# Patient Record
Sex: MALE | Race: WHITE | Employment: UNEMPLOYED | URBAN - METROPOLITAN AREA
[De-identification: names, ages, dates, MRNs, and addresses within clinical notes are randomized per-mention and may not be internally consistent; named-entity substitution may affect disease eponyms.]

---

## 2022-10-17 ENCOUNTER — HOSPITAL ENCOUNTER (INPATIENT)
Age: 42
LOS: 4 days | Discharge: ANOTHER HOSPITAL | DRG: 751 | End: 2022-10-21
Attending: EMERGENCY MEDICINE | Admitting: PSYCHIATRY & NEUROLOGY
Payer: MEDICAID

## 2022-10-17 DIAGNOSIS — F32.2 CURRENT SEVERE EPISODE OF MAJOR DEPRESSIVE DISORDER WITHOUT PSYCHOTIC FEATURES, UNSPECIFIED WHETHER RECURRENT (HCC): Primary | ICD-10-CM

## 2022-10-17 PROBLEM — F33.2 SEVERE RECURRENT MAJOR DEPRESSION WITHOUT PSYCHOTIC FEATURES (HCC): Status: ACTIVE | Noted: 2022-10-17

## 2022-10-17 LAB
ACETAMINOPHEN LEVEL: <5 UG/ML (ref 10–30)
ALBUMIN SERPL-MCNC: 4.3 G/DL (ref 3.5–4.6)
ALP BLD-CCNC: 107 U/L (ref 35–104)
ALT SERPL-CCNC: 19 U/L (ref 0–41)
AMPHETAMINE SCREEN, URINE: POSITIVE
ANION GAP SERPL CALCULATED.3IONS-SCNC: 10 MEQ/L (ref 9–15)
AST SERPL-CCNC: 15 U/L (ref 0–40)
BARBITURATE SCREEN URINE: ABNORMAL
BASOPHILS ABSOLUTE: 0 K/UL (ref 0–0.2)
BASOPHILS RELATIVE PERCENT: 0.3 %
BENZODIAZEPINE SCREEN, URINE: ABNORMAL
BILIRUB SERPL-MCNC: 0.3 MG/DL (ref 0.2–0.7)
BILIRUBIN URINE: NEGATIVE
BLOOD, URINE: NEGATIVE
BUN BLDV-MCNC: 10 MG/DL (ref 6–20)
CALCIUM SERPL-MCNC: 9 MG/DL (ref 8.5–9.9)
CANNABINOID SCREEN URINE: POSITIVE
CHLORIDE BLD-SCNC: 104 MEQ/L (ref 95–107)
CHOLESTEROL, TOTAL: 191 MG/DL (ref 0–199)
CLARITY: CLEAR
CO2: 27 MEQ/L (ref 20–31)
COCAINE METABOLITE SCREEN URINE: ABNORMAL
COLOR: YELLOW
CREAT SERPL-MCNC: 0.83 MG/DL (ref 0.7–1.2)
EOSINOPHILS ABSOLUTE: 0.2 K/UL (ref 0–0.7)
EOSINOPHILS RELATIVE PERCENT: 1.8 %
ETHANOL PERCENT: NORMAL G/DL
ETHANOL: <10 MG/DL (ref 0–0.08)
FENTANYL SCREEN, URINE: ABNORMAL
GFR AFRICAN AMERICAN: >60
GFR NON-AFRICAN AMERICAN: >60
GLOBULIN: 2.8 G/DL (ref 2.3–3.5)
GLUCOSE BLD-MCNC: 113 MG/DL (ref 70–99)
GLUCOSE URINE: NEGATIVE MG/DL
HCT VFR BLD CALC: 44.8 % (ref 42–52)
HDLC SERPL-MCNC: 48 MG/DL (ref 40–59)
HEMOGLOBIN: 14.7 G/DL (ref 14–18)
KETONES, URINE: NEGATIVE MG/DL
LDL CHOLESTEROL CALCULATED: 135 MG/DL (ref 0–129)
LEUKOCYTE ESTERASE, URINE: NEGATIVE
LYMPHOCYTES ABSOLUTE: 1.2 K/UL (ref 1–4.8)
LYMPHOCYTES RELATIVE PERCENT: 12.1 %
Lab: ABNORMAL
MCH RBC QN AUTO: 29.9 PG (ref 27–31.3)
MCHC RBC AUTO-ENTMCNC: 32.8 % (ref 33–37)
MCV RBC AUTO: 91.3 FL (ref 80–100)
METHADONE SCREEN, URINE: ABNORMAL
MONOCYTES ABSOLUTE: 0.7 K/UL (ref 0.2–0.8)
MONOCYTES RELATIVE PERCENT: 7.3 %
NEUTROPHILS ABSOLUTE: 7.7 K/UL (ref 1.4–6.5)
NEUTROPHILS RELATIVE PERCENT: 78.5 %
NITRITE, URINE: NEGATIVE
OPIATE SCREEN URINE: ABNORMAL
OXYCODONE URINE: ABNORMAL
PDW BLD-RTO: 13.9 % (ref 11.5–14.5)
PH UA: 5.5 (ref 5–9)
PHENCYCLIDINE SCREEN URINE: ABNORMAL
PLATELET # BLD: 250 K/UL (ref 130–400)
POTASSIUM SERPL-SCNC: 3.7 MEQ/L (ref 3.4–4.9)
PROPOXYPHENE SCREEN: ABNORMAL
PROTEIN UA: NEGATIVE MG/DL
RBC # BLD: 4.91 M/UL (ref 4.7–6.1)
SALICYLATE, SERUM: <0.3 MG/DL (ref 15–30)
SARS-COV-2, NAAT: NOT DETECTED
SODIUM BLD-SCNC: 141 MEQ/L (ref 135–144)
SPECIFIC GRAVITY UA: 1.02 (ref 1–1.03)
TOTAL CK: 68 U/L (ref 0–190)
TOTAL PROTEIN: 7.1 G/DL (ref 6.3–8)
TRIGL SERPL-MCNC: 38 MG/DL (ref 0–150)
TSH SERPL DL<=0.05 MIU/L-ACNC: 1.45 UIU/ML (ref 0.44–3.86)
URINE REFLEX TO CULTURE: NORMAL
UROBILINOGEN, URINE: 1 E.U./DL
WBC # BLD: 9.8 K/UL (ref 4.8–10.8)

## 2022-10-17 PROCEDURE — 36415 COLL VENOUS BLD VENIPUNCTURE: CPT

## 2022-10-17 PROCEDURE — 80143 DRUG ASSAY ACETAMINOPHEN: CPT

## 2022-10-17 PROCEDURE — 84443 ASSAY THYROID STIM HORMONE: CPT

## 2022-10-17 PROCEDURE — 81003 URINALYSIS AUTO W/O SCOPE: CPT

## 2022-10-17 PROCEDURE — 85025 COMPLETE CBC W/AUTO DIFF WBC: CPT

## 2022-10-17 PROCEDURE — 82550 ASSAY OF CK (CPK): CPT

## 2022-10-17 PROCEDURE — 80061 LIPID PANEL: CPT

## 2022-10-17 PROCEDURE — 80307 DRUG TEST PRSMV CHEM ANLYZR: CPT

## 2022-10-17 PROCEDURE — 80179 DRUG ASSAY SALICYLATE: CPT

## 2022-10-17 PROCEDURE — 99285 EMERGENCY DEPT VISIT HI MDM: CPT

## 2022-10-17 PROCEDURE — 80053 COMPREHEN METABOLIC PANEL: CPT

## 2022-10-17 PROCEDURE — 1240000000 HC EMOTIONAL WELLNESS R&B

## 2022-10-17 PROCEDURE — 82077 ASSAY SPEC XCP UR&BREATH IA: CPT

## 2022-10-17 PROCEDURE — 87635 SARS-COV-2 COVID-19 AMP PRB: CPT

## 2022-10-17 RX ORDER — HYDROXYZINE PAMOATE 50 MG/1
50 CAPSULE ORAL EVERY 6 HOURS PRN
Status: DISCONTINUED | OUTPATIENT
Start: 2022-10-17 | End: 2022-10-21 | Stop reason: HOSPADM

## 2022-10-17 RX ORDER — ACETAMINOPHEN 325 MG/1
650 TABLET ORAL EVERY 4 HOURS PRN
Status: DISCONTINUED | OUTPATIENT
Start: 2022-10-17 | End: 2022-10-21 | Stop reason: HOSPADM

## 2022-10-17 RX ORDER — HYDROXYZINE HYDROCHLORIDE 50 MG/ML
50 INJECTION, SOLUTION INTRAMUSCULAR EVERY 6 HOURS PRN
Status: DISCONTINUED | OUTPATIENT
Start: 2022-10-17 | End: 2022-10-21 | Stop reason: HOSPADM

## 2022-10-17 RX ORDER — BENZTROPINE MESYLATE 1 MG/ML
2 INJECTION INTRAMUSCULAR; INTRAVENOUS 2 TIMES DAILY PRN
Status: DISCONTINUED | OUTPATIENT
Start: 2022-10-17 | End: 2022-10-21 | Stop reason: HOSPADM

## 2022-10-17 RX ORDER — HALOPERIDOL 5 MG/1
5 TABLET ORAL EVERY 6 HOURS PRN
Status: DISCONTINUED | OUTPATIENT
Start: 2022-10-17 | End: 2022-10-21 | Stop reason: HOSPADM

## 2022-10-17 RX ORDER — HALOPERIDOL 5 MG/ML
5 INJECTION INTRAMUSCULAR EVERY 6 HOURS PRN
Status: DISCONTINUED | OUTPATIENT
Start: 2022-10-17 | End: 2022-10-21 | Stop reason: HOSPADM

## 2022-10-17 RX ORDER — POLYETHYLENE GLYCOL 3350 17 G
2 POWDER IN PACKET (EA) ORAL
Status: DISCONTINUED | OUTPATIENT
Start: 2022-10-17 | End: 2022-10-21 | Stop reason: HOSPADM

## 2022-10-17 RX ORDER — TRAZODONE HYDROCHLORIDE 50 MG/1
50 TABLET ORAL NIGHTLY PRN
Status: DISCONTINUED | OUTPATIENT
Start: 2022-10-17 | End: 2022-10-21 | Stop reason: HOSPADM

## 2022-10-17 ASSESSMENT — PAIN - FUNCTIONAL ASSESSMENT: PAIN_FUNCTIONAL_ASSESSMENT: NONE - DENIES PAIN

## 2022-10-17 ASSESSMENT — SLEEP AND FATIGUE QUESTIONNAIRES: AVERAGE NUMBER OF SLEEP HOURS: 12

## 2022-10-17 NOTE — ED NOTES
Packet sent to [facility] for review for indigent bed form.      Zoey Cruz RN  10/17/22 [id number]

## 2022-10-17 NOTE — ED NOTES
2nd call out to Dr Sepideh Ferrell, no answer, voicemail left.      [facility], RN  10/17/22 4941

## 2022-10-17 NOTE — PROGRESS NOTES
Pt arrived to unit. Pt oriented to unit surroundings, policies and services. Pt shown to room and advised of the need to complete admission assessment and sign consents. Pt denies any needs at this time. Skin and contraband assessment completed, no skin issues noted no contraband noted. Pt toured the unit, pt given ADL supplies and a pitcher of ice water. Pt stated he has not been inpt behavioral health, pt denies any history of suicide attempt. Pt verbalized he has been oversleeping, 10 -12 hours a night and napping during the day.

## 2022-10-17 NOTE — ED NOTES
[place] called, they state supervisor Merlin Shelling approves bed form.      Fara Mask, RN  10/17/22 6261

## 2022-10-17 NOTE — PROGRESS NOTES
Pt seen in room and agreeable to leisure assessment. Pt with reduced eye contact throughout assessment and appeared to be self-limiting when answering questions. 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 Pt stated that does not have a support system currently, but does prefer to be around others stating \"this is the first time I have been alone in awhile\". Pt reported \"I bottle it up\" in re: how he deals with his feelings and that his primary coping strategy is \"having a beer\". Pt unable to identify any strengths or weaknesses and his perception of himself is poor. Pt reported that the change that needs to occur for recovery to be possible is \"to go to a rehab\".      Electronically signed by Tressa Goldberg, OT on 10/17/2022 at 6:36 PM

## 2022-10-17 NOTE — ED NOTES
Provisional Diagnosis:   Major depression w/o psych features    Psychosocial and Contextual Factors:   Substance abuse    C-SSRS Summary:      Patient: plan and intent, od on street drugs  Family:   Agency: none    Substance Abuse admit to drinking daily heavily until 2 days ago, and using variuos drugs    Present Suicidal Behavior:      Verbal: od    Attempt: none    Past Suicidal Behavior: none    Verbal: none    Attempt: none      Self-Injurious/Self-Mutilation: none      Violence Current or Past none      Trauma Identified:  none    Protective Factors:    wants both mental health and aod treatment      Risk Factors:    Substance abuse      Clinical Summary:    38year old male presents to ed with complaints of suicidal thoughts. Per patient, he had a recent and hard breakup with a women, to which he broke up with her. He reports he has been raising her children as his own for some time, and looks at them like his own daughter. He states he feel a very deep paternal bond with them. States since he broke up with her, she wont let him see them anymore. States since they aren't his biological kids, he doesn't think there is any way to get any visitation rights ordered. States he has been self medicating with drugs and ETOH for the past 2 weeks to cope with his deep depression over this issue. He reports stopping drinking 2 days ago, as well as drugs. He reports deep worsening depression, and has been having thoughts of suicide. He reports he has thoughts to overdose. He reports he believes he would go threw with it if he left. He states he has never had any mental health treatment, and has never felt this depressed before. He states he also needs help for his AOD use. He states he needs help in rehab, so he doesn't relapse as a coping . When discussing options with patient, he was agreeable to coming to [facility] and then having LGR talk with him for services at [place].  He presents goal oriented to treatment, but very tearful. He reports the oast 2 days he has slept all day and night, and doesn't feel the motivation to care for himself anymore.         Level of Care Disposition:    pending         Johan Ho RN  10/17/22 Paty Garcia6, RN  10/17/22 8449

## 2022-10-17 NOTE — ED TRIAGE NOTES
Pt was brought to the ER by family for thoughts of harming himself since recent hardships, Pt is A&OX3, anxious, afebrile, breathes are equal and unlabored,

## 2022-10-17 NOTE — ED PROVIDER NOTES
[facility] ED  EMERGENCY DEPARTMENT ENCOUNTER      Pt Name: Alexander Cagle  MRN: [id number]  [place] 1980  Date of evaluation: 10/17/2022  Provider: Moises Mccullough MD    CHIEF COMPLAINT       Chief Complaint   Patient presents with    Psychiatric Evaluation     Pt was brought to the ER for thoughts of harming himself since a recent break up with his girlfriend         HISTORY OF PRESENT ILLNESS   (Location/Symptom, Timing/Onset, Context/Setting, Quality, Duration, Modifying Factors, Severity)  Note limiting factors. 47-year-old male presenting with suicidal ideation. Notes a recent break-up causing him to feel stressed and upset. Denies any specific plan. No history of mental illness. He is unable to see his kids after the break-up. Recent heavy alcohol and drug use. Nursing Notes were reviewed. REVIEW OF SYSTEMS    (2-9 systems for level 4, 10 or more for level 5)     Review of Systems   Unable to perform ROS: Psychiatric disorder   Psychiatric/Behavioral:  Positive for agitation and suicidal ideas. All other systems reviewed and are negative. Except as noted above the remainder of the review of systems was reviewed and negative. PAST MEDICAL HISTORY   No past medical history on file. SURGICAL HISTORY     No past surgical history on file. CURRENT MEDICATIONS       Previous Medications    No medications on file       ALLERGIES     Patient has no known allergies. FAMILY HISTORY     No family history on file.        SOCIAL HISTORY       Social History     Socioeconomic History    Marital status: Single       SCREENINGS    [place] Coma Scale  Eye Opening: Spontaneous  Best Verbal Response: Oriented  Best Motor Response: Obeys commands  [place] Coma Scale Score: 15          PHYSICAL EXAM    (up to 7 for level 4, 8 or more for level 5)     ED Triage Vitals [10/17/22 1033]   BP Temp Temp Source Heart Rate Resp SpO2 Height Weight   (!) 145/87 98 °F (36.7 °C) Oral 83 16 98 % 5' 8\" (1.727 m) 200 lb (90.7 kg)       Physical Exam  Vitals and nursing note reviewed. Constitutional:       General: He is not in acute distress. Appearance: Normal appearance. He is well-developed. He is not ill-appearing. HENT:      Head: Normocephalic and atraumatic. Mouth/Throat:      Mouth: Mucous membranes are moist.      Pharynx: Oropharynx is clear. Eyes:      Extraocular Movements: Extraocular movements intact. Conjunctiva/sclera: Conjunctivae normal.   Cardiovascular:      Rate and Rhythm: Normal rate and regular rhythm. Pulmonary:      Effort: Pulmonary effort is normal.      Breath sounds: Normal breath sounds. Abdominal:      General: Bowel sounds are normal.      Palpations: Abdomen is soft. Tenderness: There is no abdominal tenderness. Musculoskeletal:         General: No deformity. Normal range of motion. Cervical back: Normal range of motion and neck supple. Skin:     General: Skin is warm and dry. Capillary Refill: Capillary refill takes less than 2 seconds. Neurological:      General: No focal deficit present. Mental Status: He is alert and oriented to person, place, and time. Mental status is at baseline. Cranial Nerves: No cranial nerve deficit.    Psychiatric:      Comments: Poor judgment       DIAGNOSTIC RESULTS     EKG: All EKG's are interpreted by the Emergency Department Physician who either signs or Co-signs this chart in the absence of a cardiologist.    RADIOLOGY:   Non-plain film images such as CT, Ultrasound and MRI are read by the radiologist. Plain radiographic images are visualized and preliminarily interpreted by the emergency physician with the below findings:    Interpretation per the Radiologist below, if available at the time of this note:    No orders to display       LABS:  Labs Reviewed   ACETAMINOPHEN LEVEL - Abnormal; Notable for the following components:       Result Value    Acetaminophen Level <5 (*) All other components within normal limits   CBC WITH AUTO DIFFERENTIAL - Abnormal; Notable for the following components:    MCHC 32.8 (*)     Neutrophils Absolute 7.7 (*)     All other components within normal limits   COMPREHENSIVE METABOLIC PANEL - Abnormal; Notable for the following components:    Glucose 113 (*)     Alkaline Phosphatase 107 (*)     All other components within normal limits   LIPID PANEL - Abnormal; Notable for the following components:    LDL Calculated 135 (*)     All other components within normal limits   SALICYLATE LEVEL - Abnormal; Notable for the following components:    Salicylate, Serum <7.8 (*)     All other components within normal limits   URINE DRUG SCREEN - Abnormal; Notable for the following components:    Amphetamine Screen, Urine POSITIVE (*)     Cannabinoid Scrn, Ur POSITIVE (*)     All other components within normal limits   COVID-19, RAPID   CK   ETHANOL   TSH   URINALYSIS WITH REFLEX TO CULTURE       All other labs were within normal range or not returned as of this dictation. EMERGENCY DEPARTMENT COURSE and DIFFERENTIAL DIAGNOSIS/MDM:   Vitals:    Vitals:    10/17/22 1033   BP: (!) 145/87   Pulse: 83   Resp: 16   Temp: 98 °F (36.7 °C)   TempSrc: Oral   SpO2: 98%   Weight: 200 lb (90.7 kg)   Height: 5' 8\" (1.727 m)       MDM  Number of Diagnoses or Management Options  Current severe episode of major depressive disorder without psychotic features, unspecified whether recurrent ([facility]  Diagnosis management comments: Medically cleared. Admitted to [address] [place] [address]. Procedures    CRITICAL CARE TIME   Total Critical Care time was 0 minutes, excluding separately reportable procedures. There was a high probability of clinically significant/life threatening deterioration in the patient's condition which required my urgent intervention. FINAL IMPRESSION      1.  Current severe episode of major depressive disorder without psychotic features, unspecified whether recurrent ([facility] DISPOSITION/PLAN   DISPOSITION Decision To Admit 10/17/2022 02:42:34 PM      (Please note that portions of this note were completed with a voice recognition program.  Efforts were made to edit the dictations but occasionally words are mis-transcribed.)    Patrick Lopez MD (electronically signed)  Attending Emergency Physician        Patrick Lopez MD  10/17/22 0029

## 2022-10-18 PROCEDURE — 6370000000 HC RX 637 (ALT 250 FOR IP): Performed by: PSYCHIATRY & NEUROLOGY

## 2022-10-18 PROCEDURE — 1240000000 HC EMOTIONAL WELLNESS R&B

## 2022-10-18 RX ADMIN — SERTRALINE HYDROCHLORIDE 50 MG: 50 TABLET ORAL at 10:50

## 2022-10-18 ASSESSMENT — SLEEP AND FATIGUE QUESTIONNAIRES
SLEEP PATTERN: NORMAL
DO YOU USE A SLEEP AID: NO
DO YOU HAVE DIFFICULTY SLEEPING: NO
AVERAGE NUMBER OF SLEEP HOURS: 10

## 2022-10-18 ASSESSMENT — LIFESTYLE VARIABLES
HOW MANY STANDARD DRINKS CONTAINING ALCOHOL DO YOU HAVE ON A TYPICAL DAY: 3 OR 4
HOW OFTEN DO YOU HAVE A DRINK CONTAINING ALCOHOL: 4 OR MORE TIMES A WEEK

## 2022-10-18 NOTE — PROGRESS NOTES
Behavioral Services  Medicare Certification Upon Admission    I certify that this patient's inpatient psychiatric hospital admission is medically necessary for:    [x] (1) Treatment which could reasonably be expected to improve this patient's condition,       [x] (2) Or for diagnostic study;     AND     [x](2) The inpatient psychiatric services are provided while the individual is under the care of a physician and are included in the individualized plan of care.     Estimated length of stay/service 3-5    Plan for post-hospital care OP care    Electronically signed by Klever Garcia MD on 10/18/2022 at 10:00 AM

## 2022-10-18 NOTE — PROGRESS NOTES
Morning Community Meeting Topics    Amira Felix attended the morning community meeting on 10/18/22. Topics discussed today     [x] Introduction  Day of the week and date  Mask distribution  Current mask requirements  [x]Teams  Explanation of  Green and Blue team criteria  Nurses assigned to each team for today  Explanation about green and blue paper  Date  Patient's Name  Patient's Nurse  Goals  [x] Visitation  Announce the visiting hours for the day  Announce which team is allowed to have visitors for the day  Review any updated Covid 19 requirements for visitors during visitation  Vaccine Card or negative Covid test within 48 hours of visit  State Identification  Patients are reminded to alert the  at least 1 hour before visitation   [x] Unit Orientation  Coffee use  Phone location and etiquette  Shower locations  Davis and dryer location and process  Common area expectations  Staff rounds expectation  [x] Meals   Educate patient to the menu  The patient is encouraged to fill out the menu to get preferences at mealtime  The patient is educated that if they do not fill out the menu, they will get the standard tray  The coffee pot is decaf, patient encouraged to order regular coffee from menu.   Educate patient to the meal process  Patient encouraged to eat snacks provided twice daily  Snacks may stay in patient room     [x] Discharge Process  Discharge expectations  Fill out the survey after discharge   [x] Hygiene  Daily showers encouraged  Showers availability discussed   Daily dressing encouraged  Discussed wearing street clothing  Education provided on where to place linens and clothing  Linens in the hamper  personal clothing does not go into the linen hamper  [x] Group   Patient encouraged to attend group provided  Time of Group Meetings discussed  Gentle reminder that attendance is a Physician order  [x] Movement  Chair exercises completed  Stretching completed  Notes: Patient did not state a goal. Electronically signed by Yuliana Vaughan[address] on 10/18/2022 at 10:00 AM

## 2022-10-18 NOTE — CARE COORDINATION
Pt assessment deferred due to pt sleeping secondary to withdrawal. Electronically signed by JUVE Dodson on 10/18/2022 at 3:02 PM

## 2022-10-18 NOTE — H&P
[address] [place] [address] - Department of Psychiatry    History and Physical - Adult         CHIEF COMPLAINT:  Depression SI    History obtained from:  patient    Patient was seen after discussing with the treatment team and reviewing the chart        CIRCUMSTANCES OF ADMISSION:     38year old male presents to ed with complaints of suicidal thoughts. Per patient, he had a recent and hard breakup with a women, to which he broke up with her. He reports he has been raising her children as his own for some time, and looks at them like his own daughter. He states he feel a very deep paternal bond with them. States since he broke up with her, she wont let him see them anymore. States since they aren't his biological kids, he doesn't think there is any way to get any visitation rights ordered. States he has been self medicating with drugs and ETOH for the past 2 weeks to cope with his deep depression over this issue. He reports stopping drinking 2 days ago, as well as drugs. He reports deep worsening depression, and has been having thoughts of suicide. He reports he has thoughts to overdose. He reports he believes he would go threw with it if he left. He states he has never had any mental health treatment, and has never felt this depressed before. He states he also needs help for his AOD use. He states he needs help in rehab, so he doesn't relapse as a coping . When discussing options with patient, he was agreeable to coming to [facility] and then having LGR talk with him for services at [place]. He presents goal oriented to treatment, but very tearful. He reports the oast 2 days he has slept all day and night, and doesn't feel the motivation to care for himself anymore. HISTORY OF PRESENT ILLNESS:      The patient is a 38 y.o. male live alone, homeless, recent separation from  of 7 years with significant past history of addiction    Pt has been feeling depressed and suicidal for past few days.  Pt was thinking of taking an overdose. Severity: Rating mood to be around 2/10 (10- good)  Quality:melancholic  Content: Hopeless, worthless and helpless feeling  Suicidal thoughts - as above  Associated symptoms:  Poor concentration, anhedonia, decrease motivation  Sleep - increase and appetite- okay    Stressors:relationship break up, homeless, no job    Pt has been self medicating with alcohol daily use 4-6, 24 ounce cans of beer, last drink was 3 days ago. Mild w/d, no seizure  Pt has been using Meth daily use - over the past 3 weeks. Prior to that it was occasional  MJ use on and off  Never been to rehab  The patient is not currently receiving care for the above psychiatric illness. Medications Prior to Admission:   No medications prior to admission. Compliance:n/a    Psychiatric Review of Systems       Depression: yes     Rosa or Hypomania:  no     Panic Attacks:  yes -     Phobias:  no     Obsessions and Compulsions:  no     PTSD : no     Hallucinations:  no     Delusions:  only when using meth      Past Psychiatric History:  Prior Diagnosis:  addiction  Psychiatrist: no  Therapist:no  Hospitalization: no  Hx of Suicidal Attempts: no  Hx of violence:  no  ECT: no  Previous discontinued Psychiatric Med Trials: no    Past Medical History:    No past medical history on file. Past Surgical History:    No past surgical history on file. Allergies:   Patient has no known allergies. Family History  No family history on file. Social History:  Born and Raised: [place]  Describes Childhood:   supportive  Education: [employer]  Employment: Unemployed, not seeking work  Relationships: single  Children: no children  Current Support:  poor     Legal Hx: none  Access to weapons?:  No      EXAMINATION:    REVIEW OF SYSTEMS:    ROS:  [x] All negative/unchanged except if checked.  Explain positive(checked items) below:  [] Constitutional  [] Eyes  [] Ear/Nose/Mouth/Throat  [] Respiratory  [] CV  [] GI  []   [] Musculoskeletal  [] Skin/Breast  [] Neurological  [] Endocrine  [] Heme/Lymph  [] Allergic/Immunologic    Explanation:     Vitals:  BP (!) 136/97   Pulse 73   Temp 97.9 °F (36.6 °C)   Resp 19   Ht 5' 8\" (1.727 m)   Wt 200 lb (90.7 kg)   SpO2 95%   BMI 30.41 kg/m²      Neurologic Exam:   Muscle Strength & Tone: full ROM  Gait: normal gait   Involuntary Movements: No    Mental Status Examination:    Level of consciousness:  within normal limits   Appearance:  ill-appearing  Behavior/Motor:  psychomotor retardation  Attitude toward examiner:  cooperative  Speech:  slow   Mood: constricted, decreased range, and depressed  Affect:  mood congruent  Thought processes:  slow   Association:  Thought content:  Suicidal Ideation:  active  Delusions:  no evidence of delusions  Perceptual Disturbance:  denies any perceptual disturbance  Cognition:  oriented to person, place, and time   Attention & Concentration poor  Memory intact  Insight poor   Judgement poor   Fund of Knowledge adequate    Mini Mental Status 30/30      DIAGNOSIS:    MDD single episode severe  Alcohol and meth use disorder          RISK ASSESSMENT:    SUICIDE RISK ASSESSMENT:high  HOMICIDE: low  AGITATION/VIOLENCE: low  ELOPEMENT: low    LABS: REVIEWED TODAY:  Recent Labs     10/17/22  1052   WBC 9.8   HGB 14.7        Recent Labs     10/17/22  1052      K 3.7      CO2 27   BUN 10   CREATININE 0.83   GLUCOSE 113*     Recent Labs     10/17/22  1052   BILITOT 0.3   ALKPHOS 107*   AST 15   ALT 19     Lab Results   Component Value Date/Time    LABAMPH POSITIVE 10/17/2022 10:45 AM    BARBSCNU Neg 10/17/2022 10:45 AM    LABBENZ Neg 10/17/2022 10:45 AM    LABMETH Neg 10/17/2022 10:45 AM    OPIATESCREENURINE Neg 10/17/2022 10:45 AM    PHENCYCLIDINESCREENURINE Neg 10/17/2022 10:45 AM    ETOH <10 10/17/2022 10:52 AM     Lab Results   Component Value Date/Time    TSH 1.450 10/17/2022 10:52 AM     No results found for: LITHIUM  No results found for: VALPROATE, CBMZ  No results found for: LITHIUM, VALPROATE    FURTHER LABS ORDERED :      Radiology   No results found. EKG: TRACING REVIEWED    TREATMENT PLAN:    Risk Management:  close watch and suicide risk    This patient was assessed for Medical bed necessity for the following reason:  N/A    Collateral Information:  Will obtain collateral information from the family or friends. Will obtain medical records as appropriate from out patient providers  Will consult the hospitalist for a physical exam to rule out any co-morbid physical condition. Home medication Reconciled       New Medications started during this admission :    See orders  Prn Haldol 5mg and Vistaril 50mg q6hr for extreme agitation. Trazodone as ordered for insomnia  Vistaril as ordered for anxiety  Discussed with the patient risk, benefit, alternative and common side effects for the  proposed medication treatment. Patient is consenting to the treatment.     Psychotherapy:   Encourage participation in milieu and group therapy  Individual therapy as needed      Electronically signed by Amy Sutton MD on 10/18/2022 at 10:00 AM

## 2022-10-18 NOTE — GROUP NOTE
Group Therapy Note    Date: 10/18/2022    Group Start Time: 1000  Group End Time: 1050  Group Topic: Psychoeducation    [facility]    Hannah Brady        Group Therapy Note    Attendees: 9/16       Patient's Goal:  \"To attend the groups\"    Notes:  Patient attended the 1000 skills group. Patient declined to work on a project but he stayed in group. Patient sat by the window, he enjoyed looking outside and enjoyed listening to the music. Status After Intervention:  Unchanged    Participation Level: Declined to work on a project.     Participation Quality: Appropriate      Speech: Quiet      Thought Process/Content: Linear      Affective Functioning: Flat      Mood:  calm      Level of consciousness:  Alert      Response to Learning: Progressing to goal      Endings: None Reported    Modes of Intervention: Education, Socialization, and Activity      Discipline Responsible: Psychoeducational Specialist      Signature:  Hannah Brady

## 2022-10-18 NOTE — PROGRESS NOTES
Patient denies S/I, H/I and AVH. Patient reports anxiety and depression 9/10. Patient reported he started feeling depressed and anxious when he stopped using meth. Patient reported using meth and drinking heavily as a way to \"numb feelings\". Patient stated he would like to go to a inpatient rehab facility because he is afraid of relapsing and using meth and drinking again. Patient also stated he was interested in speaking with a therapist to learn better coping skills.

## 2022-10-18 NOTE — PLAN OF CARE
Problem: Self Harm/Suicidality  Goal: Will have no self-injury during hospital stay  Description: INTERVENTIONS:  1. Q 30 MINUTES: Routine safety checks  2. Q SHIFT & PRN: Assess risk to determine if routine checks are adequate to maintain patient safety  Outcome: Progressing     Problem: Depression  Goal: Will be euthymic at discharge  Description: INTERVENTIONS:  1. Administer medication as ordered  2. Provide emotional support via 1:1 interaction with staff  3. Encourage involvement in milieu/groups/activities  4. Monitor for social isolation  Outcome: Progressing     Problem: Anxiety  Goal: Will report anxiety at manageable levels  Description: INTERVENTIONS:  1. Administer medication as ordered  2. Teach and rehearse alternative coping skills  3.  Provide emotional support with 1:1 interaction with staff  Outcome: Progressing  Flowsheets (Taken 10/18/2022 1109)  Will report anxiety at manageable levels:   Administer medication as ordered   Teach and rehearse alternative coping skills   Provide emotional support with 1:1 interaction with staff

## 2022-10-18 NOTE — CONSULTS
[facility] MEDICINE    HISTORY AND PHYSICAL EXAM    PATIENT NAME:  Lo Quintero    MRN:  [id number]  SERVICE DATE:  10/18/2022   SERVICE TIME:  9:21 AM    Primary Care Physician: No primary care provider on file. SUBJECTIVE  CHIEF COMPLAINT:  Medical clear for inpatient psychiatry admission. Consult for medical H/P encounter. HPI:  This is a 38 y.o. male who presents with c/o suicidal thoughts after a recent breakup. Pt denies any medical history. Urine tox screen positive for cannabinoid and amphetamine on admission. PAST MEDICAL HISTORY:  No past medical history on file. PAST SURGICAL HISTORY:  No past surgical history on file. FAMILY HISTORY:  No family history on file.   SOCIAL HISTORY:    Social History     Socioeconomic History    Marital status: Single     Spouse name: Not on file    Number of children: Not on file    Years of education: Not on file    Highest education level: Not on file   Occupational History    Not on file   Tobacco Use    Smoking status: Not on file    Smokeless tobacco: Not on file   Substance and Sexual Activity    Alcohol use: Not on file    Drug use: Not on file    Sexual activity: Not on file   Other Topics Concern    Not on file   Social History Narrative    Not on file     Social Determinants of Health     Financial Resource Strain: Not on file   Food Insecurity: Not on file   Transportation Needs: Not on file   Physical Activity: Not on file   Stress: Not on file   Social Connections: Not on file   Intimate Partner Violence: Not on file   Housing Stability: Not on file     MEDICATIONS:    Current Facility-Administered Medications   Medication Dose Route Frequency Provider Last Rate Last Admin    haloperidol lactate (HALDOL) injection 5 mg  5 mg IntraMUSCular Q6H PRN Tatianna Messina MD        Or    haloperidol (HALDOL) tablet 5 mg  5 mg Oral Q6H PRN Tatianna Messina MD        hydrOXYzine pamoate (VISTARIL) capsule 50 mg  50 mg Oral Q6H PRN Blayne Garcia Sarah Lopez MD        Or    hydrOXYzine (VISTARIL) injection 50 mg  50 mg IntraMUSCular Q6H PRN Taryn Bucio MD        acetaminophen (TYLENOL) tablet 650 mg  650 mg Oral Q4H PRN Taryn Bucio MD        traZODone (DESYREL) tablet 50 mg  50 mg Oral Nightly PRN Taryn Bucio MD        benztropine mesylate (COGENTIN) injection 2 mg  2 mg IntraMUSCular BID PRN Taryn Bucio MD        magnesium hydroxide (MILK OF MAGNESIA) 400 MG/5ML suspension 30 mL  30 mL Oral Daily PRN Taryn Bucio MD        nicotine polacrilex (COMMIT) lozenge 2 mg  2 mg Oral Q1H PRN Taryn Bucio MD           ALLERGIES: Patient has no known allergies. REVIEW OF SYSTEM:   ROS as noted in HPI, 12 point ROS reviewed and otherwise negative. OBJECTIVE  PHYSICAL EXAM: BP (!) 136/97   Pulse 73   Temp 97.9 °F (36.6 °C)   Resp 19   Ht 5' 8\" (1.727 m)   Wt 200 lb (90.7 kg)   SpO2 95%   BMI 30.41 kg/m²   CONSTITUTIONAL:  awake, alert, cooperative, no apparent distress, and appears stated age  EYES:  Lids and lashes normal, pupils equal, round and reactive to light, extra ocular muscles intact, sclera clear, conjunctiva normal  ENT:  Normocephalic, without obvious abnormality, atraumatic, sinuses nontender on palpation, external ears without lesions, oral pharynx with moist mucus membranes, tonsils without erythema or exudates, gums normal and good dentition. NECK:  Supple, symmetrical, trachea midline, no adenopathy, thyroid symmetric, not enlarged and no tenderness, skin normal  LUNGS:  No increased work of breathing, good air exchange, clear to auscultation bilaterally, no crackles or wheezing  CARDIOVASCULAR:  Normal apical impulse, regular rate and rhythm, normal S1 and S2, no S3 or S4, and no murmur noted  ABDOMEN:  No scars, normal bowel sounds, soft, non-distended, non-tender, no masses palpated, no hepatosplenomegally  MUSCULOSKELETAL:  There is no redness, warmth, or swelling of the joints.   Full range of motion noted.  Motor strength is 5 out of 5 all extremities bilaterally. Tone is normal.  NEUROLOGIC:  Awake, alert, oriented to name, place and time. DATA:     Diagnostic tests reviewed for today's visit:    Most recent labs and imaging results reviewed. ASSESSMENT AND PLAN  Principal Problem:    Severe recurrent major depression without psychotic features [facility]): continue current medication and management by psychiatrist    Tobacco Abuse: cessation counseling provided , pt has nicotine gum ordered    Elevated b/p :  no hx HTN, likely d/t anxiety, will monitor    VTE Prophylaxis: ambulation encouraged   Code status: full     This is only a history and physical examination and not medical management. The patient is to contact and follow up with their primary care physician and go over any abnormal labs, imaging, findings, medical concerns, or conditions that we have and have not addressed during this encounter.     Plan of care discussed with: patient    SIGNATURE: KULWINDER Smith CNP  DATE: October 18, 2022  TIME: 9:21 AM

## 2022-10-19 PROCEDURE — 6370000000 HC RX 637 (ALT 250 FOR IP): Performed by: PSYCHIATRY & NEUROLOGY

## 2022-10-19 PROCEDURE — 1240000000 HC EMOTIONAL WELLNESS R&B

## 2022-10-19 RX ADMIN — SERTRALINE HYDROCHLORIDE 50 MG: 50 TABLET ORAL at 09:29

## 2022-10-19 NOTE — GROUP NOTE
Group Therapy Note    Date: 10/19/2022    Group Start Time: 1300  Group End Time: 1330  Group Topic: Nursing    [facility] NATACHA Lion RN; Caryn Eddy RN        Group Therapy Note    Attendees: 6/11         Status After Intervention:  Improved    Participation Level: Interactive    Participation Quality: Appropriate      Speech:  normal      Thought Process/Content: Linear      Affective Functioning: Congruent      Mood: Euthymic      Level of consciousness:  Alert      Response to Learning: Able to verbalize current knowledge/experience and Progressing to goal      Endings: None Reported    Modes of Intervention: Socialization      Discipline Responsible: Registered Nurse      Signature:  Caryn Eddy RN

## 2022-10-19 NOTE — PLAN OF CARE
Problem: Self Harm/Suicidality  Goal: Will have no self-injury during hospital stay  Description: INTERVENTIONS:  1. Q 30 MINUTES: Routine safety checks  2. Q SHIFT & PRN: Assess risk to determine if routine checks are adequate to maintain patient safety  Outcome: Progressing     Problem: Depression  Goal: Will be euthymic at discharge  Description: INTERVENTIONS:  1. Administer medication as ordered  2. Provide emotional support via 1:1 interaction with staff  3. Encourage involvement in milieu/groups/activities  4. Monitor for social isolation  Outcome: Progressing     Problem: Anxiety  Goal: Will report anxiety at manageable levels  Description: INTERVENTIONS:  1. Administer medication as ordered  2. Teach and rehearse alternative coping skills  3.  Provide emotional support with 1:1 interaction with staff  Outcome: Progressing  Flowsheets (Taken 10/19/2022 1023)  Will report anxiety at manageable levels:   Administer medication as ordered   Teach and rehearse alternative coping skills   Provide emotional support with 1:1 interaction with staff

## 2022-10-19 NOTE — CARE COORDINATION
Group Therapy Note    Date: 10/19/2022  Start Time: 1100  End Time:  1130    Number of Participants: 7    Type of Group: Psychotherapy    Patient's Goal:  To participate in group therapy. Notes: Patient declined to attend psychoeducation group at 1100 despite encouragement by staff.      Discipline Responsible: /Counselor    JUSTINE Dumont

## 2022-10-19 NOTE — CARE COORDINATION
Brief Intervention and Referral to Treatment Summary    Patient was provided PHQ-9, AUDIT-C and DAST Screening:      PHQ-9 Score: 0  AUDIT-C Score: 6   DAST Score: 3     Patients substance use is considered     Low Risk/Healthy  Moderate Risk x  Harmful  Dependent    Patients depression is considered:     Minimal x  Mild   Moderate  Moderately Severe  Severe    Brief Education Was Provided    Patient was receptive x  Patient was not receptive      Brief Intervention Is Provided (Only for AUDIT-C or DAST)     Patient reports readiness to decrease and/or stop use and a plan was discussed x  Patient denies readiness to decrease and/or stop use and a plan was not discussed      Recommendations/Referrals for Brief and/or Specialized Treatment Provided to Patient    Patient was forthcoming about his problems with drugs or alcohol. He stated he is interested in inpatient treatment and is motivated to get help for himself.      Electronically signed by JUSTINE Winter on 10/19/2022 at 9:22 AM

## 2022-10-19 NOTE — GROUP NOTE
Group Therapy Note    Date: 10/19/2022    Group Start Time: 1400  Group End Time: 1430  Group Topic: Activity    [facility]    Rolanda Lau RN        Group Therapy Note    Attendees: 6/11       Patient's Goal:      Notes:      Status After Intervention:  Improved    Participation Level: Interactive    Participation Quality: Appropriate      Speech:  normal      Thought Process/Content: Logical      Affective Functioning: Congruent      Mood: euthymic      Level of consciousness:  Oriented x4      Response to Learning: Able to verbalize current knowledge/experience      Endings: None Reported    Modes of Intervention: Activity      Discipline Responsible: Registered Nurse      Signature:  Rolanda Lau RN

## 2022-10-19 NOTE — PROGRESS NOTES
[facility]. FOLLOW-UP NOTE       10/19/2022     Patient was seen and examined in person, Chart reviewed   Patient's case discussed with staff/team    Chief Complaint: Depression, SI    Interim History:     Pt still feel the same  Less depressed and anxious  Pt denies any AH or VH  W/d from meth - irritable and anxious, not alcohol  Pt is waiting to be assessed by [facility]  No paranoid thoughts  Has been attending groups  Appetite:   [] Normal/Unchanged  [] Increased  [x] Decreased      Sleep:       [] Normal/Unchanged  [x] Fair       [] Poor              Energy:    [] Normal/Unchanged  [] Increased  [x] Decreased        SI [] Present  [x] Absent    HI  []Present  [x] Absent     Aggression:  [] yes  [x] no    Patient is [] able  [x] unable to CONTRACT FOR SAFETY     PAST MEDICAL/PSYCHIATRIC HISTORY:   No past medical history on file. FAMILY/SOCIAL HISTORY:  No family history on file. Social History     Socioeconomic History    Marital status: Single     Spouse name: Not on file    Number of children: Not on file    Years of education: Not on file    Highest education level: Not on file   Occupational History    Not on file   Tobacco Use    Smoking status: Not on file    Smokeless tobacco: Not on file   Substance and Sexual Activity    Alcohol use: Not on file    Drug use: Not on file    Sexual activity: Not on file   Other Topics Concern    Not on file   Social History Narrative    Not on file     Social Determinants of Health     Financial Resource Strain: Not on file   Food Insecurity: Not on file   Transportation Needs: Not on file   Physical Activity: Not on file   Stress: Not on file   Social Connections: Not on file   Intimate Partner Violence: Not on file   Housing Stability: Not on file           ROS:  [x] All negative/unchanged except if checked.  Explain positive(checked items) below:  [] Constitutional  [] Eyes  [] Ear/Nose/Mouth/Throat  [] Respiratory  [] CV  [] GI  []   [] Musculoskeletal  [] Skin/Breast  [] Neurological  [] Endocrine  [] Heme/Lymph  [] Allergic/Immunologic    Explanation:     MEDICATIONS:    Current Facility-Administered Medications:     sertraline (ZOLOFT) tablet 50 mg, 50 mg, Oral, Daily, Griselda Irwin MD, 50 mg at 10/19/22 0929    haloperidol lactate (HALDOL) injection 5 mg, 5 mg, IntraMUSCular, Q6H PRN **OR** haloperidol (HALDOL) tablet 5 mg, 5 mg, Oral, Q6H PRN, Griselda Irwin MD    hydrOXYzine pamoate (VISTARIL) capsule 50 mg, 50 mg, Oral, Q6H PRN **OR** hydrOXYzine (VISTARIL) injection 50 mg, 50 mg, IntraMUSCular, Q6H PRN, Griselda Irwin MD    acetaminophen (TYLENOL) tablet 650 mg, 650 mg, Oral, Q4H PRN, Griselda Irwin MD    traZODone (DESYREL) tablet 50 mg, 50 mg, Oral, Nightly PRN, Griselda Irwin MD    benztropine mesylate (COGENTIN) injection 2 mg, 2 mg, IntraMUSCular, BID PRN, Griselda Irwin MD    magnesium hydroxide (MILK OF MAGNESIA) 400 MG/5ML suspension 30 mL, 30 mL, Oral, Daily PRN, Griselda Irwin MD    nicotine polacrilex (COMMIT) lozenge 2 mg, 2 mg, Oral, Q1H PRN, Griselda Irwin MD      Examination:  BP (!) 136/97   Pulse 73   Temp 97.9 °F (36.6 °C) (Oral)   Resp 18   Ht 5' 8\" (1.727 m)   Wt 200 lb (90.7 kg)   SpO2 95%   BMI 30.41 kg/m²   Gait - steady  Medication side effects(SE): no    Mental Status Examination:    Level of consciousness:  within normal limits   Appearance:  ill-appearing  Behavior/Motor:  psychomotor retardation  Attitude toward examiner:  cooperative  Speech:  slow   Mood: constricted, decreased range, and depressed  Affect:  mood congruent  Thought processes:  slow   Association:  Thought content:  Suicidal Ideation:  active  Delusions:  no evidence of delusions  Perceptual Disturbance:  denies any perceptual disturbance  Cognition:  oriented to person, place, and time   Attention & Concentration poor  Memory intact  Insight poor   Judgement poor   Fund of Knowledge adequate     Mini Mental Status 30/30        DIAGNOSIS:     MDD single episode severe  Alcohol and meth use disorder    LABS:    Recent Labs     10/17/22  1052   WBC 9.8   HGB 14.7        Recent Labs     10/17/22  1052      K 3.7      CO2 27   BUN 10   CREATININE 0.83   GLUCOSE 113*     Recent Labs     10/17/22  1052   BILITOT 0.3   ALKPHOS 107*   AST 15   ALT 19     Lab Results   Component Value Date/Time    LABAMPH POSITIVE 10/17/2022 10:45 AM    BARBSCNU Neg 10/17/2022 10:45 AM    LABBENZ Neg 10/17/2022 10:45 AM    LABMETH Neg 10/17/2022 10:45 AM    OPIATESCREENURINE Neg 10/17/2022 10:45 AM    PHENCYCLIDINESCREENURINE Neg 10/17/2022 10:45 AM    ETOH <10 10/17/2022 10:52 AM     Lab Results   Component Value Date/Time    TSH 1.450 10/17/2022 10:52 AM     No results found for: LITHIUM  No results found for: VALPROATE, CBMZ    RISK ASSESSMENT: high risk of suicide and relapse    Treatment Plan:  Reviewed current Medications with the patient. Medication as ordered  Risks, benefits, side effects, drug-to-drug interactions and alternatives to treatment were discussed. Collateral information: pending  CD evaluation  Encourage patient to attend group and other milieu activities.   Discharge planning discussed with the patient and treatment team.    PSYCHOTHERAPY/COUNSELING:  [x] Therapeutic interview  [x] Supportive  [] CBT  [] Ongoing  [] Other    [x] Patient continues to need, on a daily basis, active treatment furnished directly by or requiring the supervision of inpatient psychiatric personnel      Anticipated Length of stay:            Electronically signed by Chelsea Smith MD on 10/19/2022 at 10:12 AM

## 2022-10-19 NOTE — GROUP NOTE
Group Therapy Note    Date: 10/18/2022    Group Start Time: 2000  Group End Time: 2015  Group Topic: Wrap-Up    [facility]    Prudencio Guevara RN        Group Therapy Note    Attendees: 7/13       Patient's Goal:  To be more involved in treatment plan    Notes:  Progressing towards goal    Status After Intervention:  Unchanged    Participation Level:  Active Listener    Participation Quality: Appropriate      Speech:  normal      Thought Process/Content: Logical      Affective Functioning: Congruent      Mood: euthymic      Level of consciousness:  Alert      Response to Learning: Progressing to goal      Endings: None Reported    Modes of Intervention: Support      Discipline Responsible: Registered Nurse      Signature:  Prudencio Guevara RN

## 2022-10-19 NOTE — GROUP NOTE
Group Therapy Note    Date: 10/19/2022    Group Start Time: 1800  Group End Time: 0938  Group Topic: Recreational    [facility]    Héctor Ochoa RN        Group Therapy Note    Attendees: 3/9       Patient's Goal:  Played games with RN students    Notes:  Progressing towards goal    Status After Intervention:  Unchanged    Participation Level:  Active Listener    Participation Quality: Appropriate      Speech:  normal      Thought Process/Content: Logical      Affective Functioning: Congruent      Mood: euthymic      Level of consciousness:  Alert      Response to Learning: Progressing to goal      Endings: None Reported    Modes of Intervention: Socialization      Discipline Responsible: Registered Nurse      Signature:  Héctor Ochoa RN

## 2022-10-19 NOTE — CARE COORDINATION
[facility] Biopsychosocial Assessment    Current Level of Psychosocial Functioning     Independent   Dependent x  Minimal Assist     Comments:  Patient is unemployed and lives alone. He does not receive any government assistance at this time. Psychosocial High Risk Factors (check all that apply)    Unable to obtain meds   Chronic illness/pain    Substance abuse x (drugs and alcohol)  Lack of Family Support x  Financial stress x  Isolation x  Inadequate Community Resources x  Suicide attempt(s)  Not taking medications x  Victim of crime   Developmental Delay  Unable to manage personal needs    Age 72 or older   Homeless  No transportation   Readmission within 30 days  Unemployment x  Traumatic Event    Comments: Patient has at least 7 high risk factors associated with this admission. Psychiatric Advanced Directives: None Reported. Family to Involve in Treatment: Patient did not have anyone for staff to contact at this time. Sexual Orientation:  Patient is in neither a hetero or homosexual relationship at this time. Patient Strengths: Patient is willing to get the help he needs. Patient Barriers: Patient does not have any community mental health supports in place. Opiate Education Provided:  N/A    CMHC/mental health history: None Reported. Plan of Care   medication management, group/individual therapies, family meetings, psycho -education, treatment team meetings to assist with stabilization    Initial Discharge Plan:  Patient stated he is interested in inpatient rehab to address his addiction issues. Clinical Summary:    Patient is a 38year old male who was admitted to the [facility] due to the need for a psychiatric evaluation. Reportedly, patient was voicing thoughts of self harm after a recent break up with his girlfriend. When interviewed, patient presented as anxious, confused, and with the inability to focus. He was forthcoming about his challenges with addiction.  Patient stated he wanted an inpatient placement to assist with overcoming his alcoholism and drug addiction. He does not have much community support with either family or friends. Patient is currently unemployed due to his lack of motivation, severe anxiety, and chronic confusion. He remained symptomatic for most of the session. Patient was able to complete a safety plan which includes his aunt as part of his crisis plan. He can send her a text message only.      Electronically signed by JUSTINE Villatoro on 10/19/2022 at 9:35 AM

## 2022-10-19 NOTE — PROGRESS NOTES
Patient denies S/I, H/I and AVH. Patient reports feeling 5/10 anxiety and denies feelings of depression. Patient reported difficulty sleeping lastnight due to his \"mind racing\". Patient reported he felt like his heart was racing and reported increased perspiration when his mind started \"racing\". Patient would not elaborate on his thoughts or what contributing factors that could cause his mind to race. Patient just stated \"it was everything\". Patient advised to express feelings and symptoms with the Doctor. Patient observed attending groups and intermitted socialization with peers.

## 2022-10-19 NOTE — GROUP NOTE
Group Therapy Note    Date: 10/19/2022    Group Start Time: 1700  Group End Time: 1048  Group Topic: Wrap-Up    [facility]    Brooklyn Aponte RN        Group Therapy Note    Attendees: 5/10       Patient's Goal:  To attend group    Notes:  Progressing towards goal    Status After Intervention:  Unchanged    Participation Level:  Active Listener    Participation Quality: Appropriate      Speech:  normal      Thought Process/Content: Logical      Affective Functioning: Congruent      Mood: euthymic      Level of consciousness:  Alert      Response to Learning: Progressing to goal      Endings: None Reported    Modes of Intervention: Support      Discipline Responsible: Registered Nurse      Signature:  Brooklyn Aponte RN

## 2022-10-19 NOTE — GROUP NOTE
Group Therapy Note    Date: 10/19/2022    Group Start Time: 1630  Group End Time: 1700  Group Topic: Healthy Living/Wellness    [facility]    Emperatriz Madrid RN        Group Therapy Note    Attendees: 5/10       Patient's Goal:  Discuss boundaries    Notes:  Progressing towards goal    Status After Intervention:  Unchanged    Participation Level:  Active Listener    Participation Quality: Appropriate      Speech:  normal      Thought Process/Content: Logical      Affective Functioning: Congruent      Mood: euthymic      Level of consciousness:  Alert      Response to Learning: Progressing to goal      Endings: None Reported    Modes of Intervention: Support      Discipline Responsible: Registered Nurse      Signature:  Emperatriz Madrid RN

## 2022-10-19 NOTE — PLAN OF CARE
Problem: Self Harm/Suicidality  Goal: Will have no self-injury during hospital stay  Description: INTERVENTIONS:  1. Q 30 MINUTES: Routine safety checks  2. Q SHIFT & PRN: Assess risk to determine if routine checks are adequate to maintain patient safety  71/63/7087 0180 by Caryl Barthel, RN  Outcome: Progressing  10/18/2022 1402 by Gurpreet Gonzales RN  Outcome: Progressing     Problem: Depression  Goal: Will be euthymic at discharge  Description: INTERVENTIONS:  1. Administer medication as ordered  2. Provide emotional support via 1:1 interaction with staff  3. Encourage involvement in milieu/groups/activities  4. Monitor for social isolation  21/14/2643 3110 by Caryl Barthel, RN  Outcome: Progressing  10/18/2022 1402 by Gurpreet Gonzales RN  Outcome: Progressing     Problem: Anxiety  Goal: Will report anxiety at manageable levels  Description: INTERVENTIONS:  1. Administer medication as ordered  2. Teach and rehearse alternative coping skills  3. Provide emotional support with 1:1 interaction with staff  76/41/1928 8976 by Caryl Barthel, RN  Outcome: Progressing  10/18/2022 1402 by Gurpreet Gonzales RN  Outcome: Progressing  Flowsheets (Taken 10/18/2022 1109)  Will report anxiety at manageable levels:   Administer medication as ordered   Teach and rehearse alternative coping skills   Provide emotional support with 1:1 interaction with staff    Evening assessment complete. Pt assessed at the bedside. Pt clean, good eye contact and cooperative. Pt showered, Pt reported good day, \"I sleep a lot\". Pt stated \"I've been drinking since I was 13years old\". PT stated home life bad. Pt stated girlfriend is also a drinker and has cirrhosis of the liver. Pt's girlfriend is in and out of the hospital.  Pt stated girlfriend has kids pt is very attached to. Pt stated he has also been using meth for last 3 weeks to cope with girlfriend's issues. Pt stated he wants to get help and wants to go some type of rehab.   Pt stated \"I recognize it's not good and I need to change my path\". Pt denies SI/HI/AVH. Pt reports depression 8/10 and anxiety 5/10.

## 2022-10-19 NOTE — CARE COORDINATION
10- @ [id number] - As per patient's verbal request and signed consent,  reached out to [address] [place] [address] on his behalf. A voicemail was left requesting a return phone call.      Electronically signed by JUSTINE Gregory on 10/19/2022 at 9:49 AM

## 2022-10-20 PROCEDURE — 6370000000 HC RX 637 (ALT 250 FOR IP): Performed by: PSYCHIATRY & NEUROLOGY

## 2022-10-20 PROCEDURE — 1240000000 HC EMOTIONAL WELLNESS R&B

## 2022-10-20 RX ADMIN — SERTRALINE HYDROCHLORIDE 50 MG: 50 TABLET ORAL at 09:25

## 2022-10-20 NOTE — PROGRESS NOTES
Pt. declined to attend the 0900 community meeting, despite staff encouragement.  Goal - \"To have a discharge plan\" Electronically signed by Brandt Sahu [address] on 10/20/2022 at 9:43 AM

## 2022-10-20 NOTE — GROUP NOTE
Group Therapy Note    Date: 10/20/2022    Group Start Time: 1300  Group End Time: 1400  Group Topic: Healthy Living/Wellness    [facility]    Jocelyn Caldwell RN        Group Therapy Note    Attendees: 7       Patient's Goal:  To learn how music can be a therapeutic resource for coping and wellbeing    Notes:  Pt actively and appropriately participated in music therapy. Status After Intervention:  Unchanged    Participation Level:  Active Listener and Interactive    Participation Quality: Appropriate, Attentive, Sharing, and Supportive      Speech:  normal      Thought Process/Content: Logical  Linear      Affective Functioning: Congruent      Mood: euthymic      Level of consciousness:  Alert and Oriented x4      Response to Learning: Able to verbalize current knowledge/experience, Able to verbalize/acknowledge new learning, and Able to retain information      Endings: None Reported    Modes of Intervention: Education, Support, Socialization, Exploration, and Clarifying      Discipline Responsible: Registered Nurse      Signature:  Jocelyn Caldwell RN

## 2022-10-20 NOTE — GROUP NOTE
Group Therapy Note    Date: 10/20/2022    Group Start Time: 1615  Group End Time: 6492  Group Topic: Healthy Living/Wellness    [facility]    Mariano Palacios RN        Group Therapy Note    Attendees: 6         Patient's Goal:  how to self soothe    Status After Intervention:  Improved    Participation Level:  Active Listener and Interactive    Participation Quality: Appropriate, Attentive, and Sharing      Speech:  normal      Thought Process/Content: Logical      Affective Functioning: Congruent      Mood: euthymic      Level of consciousness:  Alert and Oriented x4      Response to Learning: Able to verbalize current knowledge/experience, Able to verbalize/acknowledge new learning, and Able to retain information      Endings: None Reported    Modes of Intervention: Education and Activity      Discipline Responsible: Registered Nurse      Signature:  Mariano Palacios RN

## 2022-10-20 NOTE — PLAN OF CARE
Patient is isolating to his room this evening. Pt reports it was a good day and he was glad he was able to speak with the doctor this morning. Pt denies SI/HI and AVH. Pt states his anxiety is better. Denies depression. Pt is attending groups. Denies further needs at this time. Self Harm/Suicidality  Goal: Will have no self-injury during hospital stay  Description: INTERVENTIONS:  1. Q 30 MINUTES: Routine safety checks  2. Q SHIFT & PRN: Assess risk to determine if routine checks are adequate to maintain patient safety  10/19/2022 1958 by Carmen Lozano RN  Outcome: Progressing  10/19/2022 1222 by Isabell Alex RN  Outcome: Progressing     Problem: Depression  Goal: Will be euthymic at discharge  Description: INTERVENTIONS:  1. Administer medication as ordered  2. Provide emotional support via 1:1 interaction with staff  3. Encourage involvement in milieu/groups/activities  4. Monitor for social isolation  10/19/2022 1958 by Carmen Lozano RN  Outcome: Progressing  10/19/2022 1222 by Isabell Alex RN  Outcome: Progressing     Problem: Anxiety  Goal: Will report anxiety at manageable levels  Description: INTERVENTIONS:  1. Administer medication as ordered  2. Teach and rehearse alternative coping skills  3.  Provide emotional support with 1:1 interaction with staff  10/19/2022 1958 by Carmen Lozano RN  Outcome: Progressing  10/19/2022 1222 by Isabell Alex RN  Outcome: Progressing  Flowsheets (Taken 10/19/2022 1023)  Will report anxiety at manageable levels:   Administer medication as ordered   Teach and rehearse alternative coping skills   Provide emotional support with 1:1 interaction with staff

## 2022-10-20 NOTE — CARE COORDINATION
Spoke with Stephy Gutierrez from [facility]. He stated that [facility] is [facility] at [facility] and [facility] will take patients with no insurance. Patient is agreeable. Stephy Gutierrez will call [facility] today and give this writer a call back. Goal is for patient to go there tomorrow.

## 2022-10-20 NOTE — PLAN OF CARE
Patient is friendly and cooperative. Isolative to room but attends groups and eats out in the DR. Patient is social when he is out. Patient has a plan to go to [facility] Friday for 45 days. He misses his family and is anxious about leaving them for that period of time. Rates anxiety 2/10 and depression a 0/10. States he is sad about being away from family but he is not depressed. Patient is future goal oriented. Problem: Self Harm/Suicidality  Goal: Will have no self-injury during hospital stay  Description: INTERVENTIONS:  1. Q 30 MINUTES: Routine safety checks  2. Q SHIFT & PRN: Assess risk to determine if routine checks are adequate to maintain patient safety  Outcome: Progressing     Problem: Depression  Goal: Will be euthymic at discharge  Description: INTERVENTIONS:  1. Administer medication as ordered  2. Provide emotional support via 1:1 interaction with staff  3. Encourage involvement in milieu/groups/activities  4. Monitor for social isolation  Outcome: Progressing     Problem: Anxiety  Goal: Will report anxiety at manageable levels  Description: INTERVENTIONS:  1. Administer medication as ordered  2. Teach and rehearse alternative coping skills  3.  Provide emotional support with 1:1 interaction with staff  Outcome: Progressing

## 2022-10-20 NOTE — PROGRESS NOTES
[facility] FOLLOW-UP NOTE       10/20/2022     Patient was seen and examined in person, Chart reviewed   Patient's case discussed with staff/team    Chief Complaint: Depression, SI    Interim History:     Pt report feeling better  Less depressed  Not suicidal  No AH or VH  Motivated to stay clean  Met with [facility] and waiting for a place  Pt report that he can go home and wait if there is no bed by tomorrow  Appetite:   [] Normal/Unchanged  [] Increased  [x] Decreased      Sleep:       [] Normal/Unchanged  [x] Fair       [] Poor              Energy:    [] Normal/Unchanged  [] Increased  [x] Decreased        SI [] Present  [x] Absent    HI  []Present  [x] Absent     Aggression:  [] yes  [x] no    Patient is [] able  [] unable to CONTRACT FOR SAFETY     PAST MEDICAL/PSYCHIATRIC HISTORY:   No past medical history on file. FAMILY/SOCIAL HISTORY:  No family history on file. Social History     Socioeconomic History    Marital status: Single     Spouse name: Not on file    Number of children: Not on file    Years of education: Not on file    Highest education level: Not on file   Occupational History    Not on file   Tobacco Use    Smoking status: Not on file    Smokeless tobacco: Not on file   Substance and Sexual Activity    Alcohol use: Not on file    Drug use: Not on file    Sexual activity: Not on file   Other Topics Concern    Not on file   Social History Narrative    Not on file     Social Determinants of Health     Financial Resource Strain: Not on file   Food Insecurity: Not on file   Transportation Needs: Not on file   Physical Activity: Not on file   Stress: Not on file   Social Connections: Not on file   Intimate Partner Violence: Not on file   Housing Stability: Not on file           ROS:  [x] All negative/unchanged except if checked.  Explain positive(checked items) below:  [] Constitutional  [] Eyes  [] Ear/Nose/Mouth/Throat  [] Respiratory  [] CV  [] GI  []   [] Musculoskeletal  [] Skin/Breast  [] Neurological  [] Endocrine  [] Heme/Lymph  [] Allergic/Immunologic    Explanation:     MEDICATIONS:    Current Facility-Administered Medications:     sertraline (ZOLOFT) tablet 50 mg, 50 mg, Oral, Daily, Ankush Graves MD, 50 mg at 10/20/22 7094    haloperidol lactate (HALDOL) injection 5 mg, 5 mg, IntraMUSCular, Q6H PRN **OR** haloperidol (HALDOL) tablet 5 mg, 5 mg, Oral, Q6H PRN, Ankush Graves MD    hydrOXYzine pamoate (VISTARIL) capsule 50 mg, 50 mg, Oral, Q6H PRN **OR** hydrOXYzine (VISTARIL) injection 50 mg, 50 mg, IntraMUSCular, Q6H PRN, Ankush Graves MD    acetaminophen (TYLENOL) tablet 650 mg, 650 mg, Oral, Q4H PRN, Ankush Graves MD    traZODone (DESYREL) tablet 50 mg, 50 mg, Oral, Nightly PRN, Ankush Graves MD    benztropine mesylate (COGENTIN) injection 2 mg, 2 mg, IntraMUSCular, BID PRN, Ankush Graves MD    magnesium hydroxide (MILK OF MAGNESIA) 400 MG/5ML suspension 30 mL, 30 mL, Oral, Daily PRN, Ankush Graves MD    nicotine polacrilex (COMMIT) lozenge 2 mg, 2 mg, Oral, Q1H PRN, Ankush Graves MD      Examination:  /87   Pulse 69   Temp 98 °F (36.7 °C) (Oral)   Resp 20   Ht 5' 8\" (1.727 m)   Wt 200 lb (90.7 kg)   SpO2 98%   BMI 30.41 kg/m²   Gait - steady  Medication side effects(SE): no    Mental Status Examination:    Level of consciousness:  within normal limits   Appearance:  normal-appearing  Behavior/Motor:  psychomotor activity normal  Attitude toward examiner:  cooperative  Speech:  slow   Mood: less depressed  Affect:  mood congruent  Thought processes:  slow   Association:  Thought content:  Suicidal Ideation:  denies  Delusions:  no evidence of delusions  Perceptual Disturbance:  denies any perceptual disturbance  Cognition:  oriented to person, place, and time   Attention & Concentration poor  Memory intact  Insight better  Judgement better  Fund of Knowledge adequate     Mini Mental Status 30/30 DIAGNOSIS:     MDD single episode severe  Alcohol and meth use disorder    LABS:    Recent Labs     10/17/22  1052   WBC 9.8   HGB 14.7        Recent Labs     10/17/22  1052      K 3.7      CO2 27   BUN 10   CREATININE 0.83   GLUCOSE 113*     Recent Labs     10/17/22  1052   BILITOT 0.3   ALKPHOS 107*   AST 15   ALT 19     Lab Results   Component Value Date/Time    LABAMPH POSITIVE 10/17/2022 10:45 AM    BARBSCNU Neg 10/17/2022 10:45 AM    LABBENZ Neg 10/17/2022 10:45 AM    LABMETH Neg 10/17/2022 10:45 AM    OPIATESCREENURINE Neg 10/17/2022 10:45 AM    PHENCYCLIDINESCREENURINE Neg 10/17/2022 10:45 AM    ETOH <10 10/17/2022 10:52 AM     Lab Results   Component Value Date/Time    TSH 1.450 10/17/2022 10:52 AM     No results found for: LITHIUM  No results found for: VALPROATE, CBMZ    Treatment Plan:  Reviewed current Medications with the patient. Medication as ordered  Risks, benefits, side effects, drug-to-drug interactions and alternatives to treatment were discussed. Collateral information: pending  CD evaluation  Encourage patient to attend group and other milieu activities. Discharge planning discussed with the patient and treatment team.    PSYCHOTHERAPY/COUNSELING:  [x] Therapeutic interview  [x] Supportive  [] CBT  [] Ongoing  [] Other  Patient was seen 1:1 for 20 minutes, other than E&M time spent, focusing on      - coping skills techniques     - Anxiety management techniques discussed including deep breathing exercise and PMR     - discussing patients strength and weakness      - Motivational interviewing to assess the stage of change and assessing patient readiness to quit substance use.      - Focusing on negative cognition and maladaptive thoughts, which is feeding and maintaining the depression symptoms      [x] Patient continues to need, on a daily basis, active treatment furnished directly by or requiring the supervision of inpatient psychiatric personnel      Anticipated Length of stay:            Electronically signed by Elva Hull MD on 10/20/2022 at 10:32 AM

## 2022-10-20 NOTE — CARE COORDINATION
Called [facility] and made a referral.  Made them aware that patient wants inpatient but has no insurance and financial cannot apply until there is an address. May move into an L[facility]Y bed at [facility]. They will send a peer supporter to the unit to meet with patient. [facility] stated there are no 30 day inpt that will accept without insurance.

## 2022-10-20 NOTE — CARE COORDINATION
[facility]. He will need a 60 day supply of meds. Yinka from [facility] will pick him up tomorrow at 11am for transport.

## 2022-10-21 VITALS
WEIGHT: 200 LBS | BODY MASS INDEX: 30.31 KG/M2 | TEMPERATURE: 98.4 F | HEART RATE: 71 BPM | DIASTOLIC BLOOD PRESSURE: 81 MMHG | OXYGEN SATURATION: 99 % | SYSTOLIC BLOOD PRESSURE: 127 MMHG | HEIGHT: 68 IN | RESPIRATION RATE: 18 BRPM

## 2022-10-21 PROCEDURE — 6370000000 HC RX 637 (ALT 250 FOR IP): Performed by: PSYCHIATRY & NEUROLOGY

## 2022-10-21 RX ADMIN — SERTRALINE HYDROCHLORIDE 50 MG: 50 TABLET ORAL at 08:49

## 2022-10-21 NOTE — PROGRESS NOTES
Reviewed discharge instructions with patient. Pt. Verbalized understanding. Denies SI/HI/AVH. Ambulatory off unit. Patient has left the floor.

## 2022-10-21 NOTE — GROUP NOTE
Group Therapy Note    Date: 10/20/2022    Group Start Time: 2030  Group End Time: 2045  Group Topic: Wrap-Up    [facility]    Pineda Munoz RN        Group Therapy Note    Attendees: 8/11       Patient's Goal:  To be discharged    Notes:  Progressing towards goal    Status After Intervention:  Unchanged    Participation Level:  Active Listener    Participation Quality: Appropriate      Speech:  normal      Thought Process/Content: Logical      Affective Functioning: Congruent      Mood: euthymic      Level of consciousness:  Alert      Response to Learning: Progressing to goal      Endings: None Reported    Modes of Intervention: Support      Discipline Responsible: Registered Nurse      Signature:  Pineda Munoz RN

## 2022-10-21 NOTE — DISCHARGE INSTR - DIET

## 2022-10-21 NOTE — GROUP NOTE
Group Therapy Note    Date: 10/20/2022    Group Start Time: 2000  Group End Time: 2030  Group Topic: Recreational    [facility]    Bonnie Mobley RN        Group Therapy Note    Attendees: 8/11       Patient's Goal:  To attend group    Notes:  Good participation    Status After Intervention:  Unchanged    Participation Level:  Active Listener    Participation Quality: Appropriate      Speech:  normal      Thought Process/Content: Logical      Affective Functioning: Congruent      Mood: euthymic      Level of consciousness:  Alert      Response to Learning: Progressing to goal      Endings: None Reported    Modes of Intervention: Socialization      Discipline Responsible: Registered Nurse      Signature:  Bonnie Mobley RN

## 2022-10-21 NOTE — PROGRESS NOTES
I have reviewed discharge instructions with the patient. The patient verbalized understanding. Patient is waiting on ride from [facility] with [facility].

## 2022-10-21 NOTE — DISCHARGE SUMMARY
DISCHARGE SUMMARY      Patient ID:  Neeta Cruz  [id number]  38 y.o.  1980      Admit date: 10/17/2022    Discharge date and time: 10/21/2022    Admitting Physician: Carlos West MD     Discharge Physician: Dr Lesia Fuller MD    Admission Diagnoses: Current severe episode of major depressive disorder without psychotic features, unspecified whether recurrent ([facility] [F32.2]  Severe recurrent major depression without psychotic features ([facility] [F33.2]    Admission Condition: poor    Discharged Condition: stable    Admission Circumstance:     38year old male presents to ed with complaints of suicidal thoughts. Per patient, he had a recent and hard breakup with a women, to which he broke up with her. He reports he has been raising her children as his own for some time, and looks at them like his own daughter. He states he feel a very deep paternal bond with them. States since he broke up with her, she wont let him see them anymore. States since they aren't his biological kids, he doesn't think there is any way to get any visitation rights ordered. States he has been self medicating with drugs and ETOH for the past 2 weeks to cope with his deep depression over this issue. He reports stopping drinking 2 days ago, as well as drugs. He reports deep worsening depression, and has been having thoughts of suicide. He reports he has thoughts to overdose. He reports he believes he would go threw with it if he left. He states he has never had any mental health treatment, and has never felt this depressed before. He states he also needs help for his AOD use. He states he needs help in rehab, so he doesn't relapse as a coping . When discussing options with patient, he was agreeable to coming to [facility] and then having [facility] talk with him for services at [facility]. He presents goal oriented to treatment, but very tearful.  He reports the oast 2 days he has slept all day and night, and doesn't feel the motivation to care for himself anymore. HISTORY OF PRESENT ILLNESS:       The patient is a 38 y.o. male live alone, homeless, recent separation from GF of 7 years with significant past history of addiction     Pt has been feeling depressed and suicidal for past few days. Pt was thinking of taking an overdose. Severity: Rating mood to be around 2/10 (10- good)  Quality:melancholic  Content: Hopeless, worthless and helpless feeling  Suicidal thoughts - as above  Associated symptoms:  Poor concentration, anhedonia, decrease motivation  Sleep - increase and appetite- okay     Stressors:relationship break up, homeless, no job     Pt has been self medicating with alcohol daily use 4-6, 24 ounce cans of beer, last drink was 3 days ago. Mild w/d, no seizure  Pt has been using Meth daily use - over the past 3 weeks. Prior to that it was occasional  MJ use on and off  Never been to rehab  The patient is not currently receiving care for the above psychiatric illness. Medications Prior to Admission:   Prescriptions Prior to Admission   No medications prior to admission. Compliance:n/a     Psychiatric Review of Systems       Depression: yes     Rosa or Hypomania:  no     Panic Attacks:  yes -     Phobias:  no     Obsessions and Compulsions:  no     PTSD : no     Hallucinations:  no     Delusions:  only when using meth        Past Psychiatric History:  Prior Diagnosis:  addiction  Psychiatrist: no  Therapist:no  Hospitalization: no  Hx of Suicidal Attempts: no  Hx of violence:  no  ECT: no  Previous discontinued Psychiatric Med Trials: no        PAST MEDICAL/PSYCHIATRIC HISTORY:   No past medical history on file. FAMILY/SOCIAL HISTORY:  No family history on file.   Social History     Socioeconomic History    Marital status: Single     Spouse name: Not on file    Number of children: Not on file    Years of education: Not on file    Highest education level: Not on file   Occupational History    Not on file   Tobacco Use    Smoking status: Not on file    Smokeless tobacco: Not on file   Substance and Sexual Activity    Alcohol use: Not on file    Drug use: Not on file    Sexual activity: Not on file   Other Topics Concern    Not on file   Social History Narrative    Not on file     Social Determinants of Health     Financial Resource Strain: Not on file   Food Insecurity: Not on file   Transportation Needs: Not on file   Physical Activity: Not on file   Stress: Not on file   Social Connections: Not on file   Intimate Partner Violence: Not on file   Housing Stability: Not on file       MEDICATIONS:    Current Facility-Administered Medications:     sertraline (ZOLOFT) tablet 50 mg, 50 mg, Oral, Daily, Melissa Zapata MD, 50 mg at 10/21/22 0849    haloperidol lactate (HALDOL) injection 5 mg, 5 mg, IntraMUSCular, Q6H PRN **OR** haloperidol (HALDOL) tablet 5 mg, 5 mg, Oral, Q6H PRN, Melissa Zapata MD    hydrOXYzine pamoate (VISTARIL) capsule 50 mg, 50 mg, Oral, Q6H PRN **OR** hydrOXYzine (VISTARIL) injection 50 mg, 50 mg, IntraMUSCular, Q6H PRN, Melissa Zapata MD    acetaminophen (TYLENOL) tablet 650 mg, 650 mg, Oral, Q4H PRN, Melissa Zapata MD    traZODone (DESYREL) tablet 50 mg, 50 mg, Oral, Nightly PRN, Melissa Zapata MD    benztropine mesylate (COGENTIN) injection 2 mg, 2 mg, IntraMUSCular, BID PRN, Melissa Zapata MD    magnesium hydroxide (MILK OF MAGNESIA) 400 MG/5ML suspension 30 mL, 30 mL, Oral, Daily PRN, Melissa Zapata MD    nicotine polacrilex (COMMIT) lozenge 2 mg, 2 mg, Oral, Q1H PRN, Melissa Zapata MD    Examination:  /81   Pulse 71   Temp 98.4 °F (36.9 °C) (Oral)   Resp 18   Ht 5' 8\" (1.727 m)   Wt 200 lb (90.7 kg)   SpO2 99%   BMI 30.41 kg/m²   Gait - steady    HOSPITAL COURSE[de-identified]  Following admission to the hospital, patient had a complete physical exam and blood work up  Patient was monitored closely with suicide precaution  Patient was started on medication as listed below  Was encouraged to participate in group and other milieu activity  Patient started to feel better with this combination of treatment. Significant progress in the symptoms since admission. Mood better, with the score of 2/10 - bad  No AVH or paranoid thoughts  No Hopeless or worthless feeling  No active SI/HI  Appetite:  [x] Normal  [] Increased  [] Decreased    Sleep:       [x] Normal  [] Fair       [] Poor            Energy:    [x] Normal  [] Increased  [] Decreased     SI [] Present  [x] Absent  HI  []Present  [x] Absent   Aggression:  [] yes  [] no  Patient is [x] able  [] unable to CONTRACT FOR SAFETY   Medication side effects(SE):  [x] None(Psych. Meds.) [] Other      Mental Status Examination on discharge:    Level of consciousness:  within normal limits   Appearance:  well-appearing  Behavior/Motor:  no abnormalities noted  Attitude toward examiner:  attentive and good eye contact  Speech:  spontaneous, normal rate and normal volume   Mood: euthymic  Affect:  blunted  Thought processes:  goal directed   Thought content:  Suicidal Ideation:  denies suicidal ideation  Cognition:  oriented to person, place, and time   Concentration intact  Memory intact  Insight good   Judgement fair   Fund of Knowledge adequate      ASSESSMENT:  Patient symptoms are:  [x] Well controlled  [x] Improving  [] Worsening  [] No change      Diagnosis:  Principal Problem:    Severe recurrent major depression without psychotic features ([facility] [address]  Resolved Problems:    * No resolved hospital problems. *      LABS:    No results for input(s): WBC, HGB, PLT in the last 72 hours. No results for input(s): NA, K, CL, CO2, BUN, CREATININE, GLUCOSE in the last 72 hours. No results for input(s): BILITOT, ALKPHOS, AST, ALT in the last 72 hours.   Lab Results   Component Value Date/Time    [facility] POSITIVE 10/17/2022 10:45 AM    BARBSCNU Neg 10/17/2022 10:45 AM    LABBENZ Neg 10/17/2022 10:45 AM    LABMETH Neg 10/17/2022 10:45 AM    OPIATESCREENURINE Neg 10/17/2022 10:45 AM    PHENCYCLIDINESCREENURINE Neg 10/17/2022 10:45 AM    ETOH <10 10/17/2022 10:52 AM     Lab Results   Component Value Date/Time    TSH 1.450 10/17/2022 10:52 AM     No results found for: LITHIUM  No results found for: VALPROATE, CBMZ    RISK ASSESSMENT AT DISCHARGE: Low risk for suicide and homicide. Treatment Plan:  Reviewed current Medications with the patient. Education provided on the complaince with treatment. Risks, benefits, side effects, drug-to-drug interactions and alternatives to treatment were discussed. Encourage patient to attend outpatient follow up appointment and therapy. Patient was advised to call the outpatient provider, visit the nearest ED or call 911 if symptoms are not manageable. Patient's family member was contacted prior to the discharge.          Medication List        START taking these medications      sertraline 50 MG tablet  Commonly known as: ZOLOFT  Take 1 tablet by mouth daily  Start taking on: October 22, 2022               Where to Get Your Medications        You can get these medications from any pharmacy    Bring a paper prescription for each of these medications  sertraline 50 MG tablet           Reason for more than one antipsychotic:   [x] N/A  [] 3 failed monotherapy(drugs tried):  [] Cross over to a new antipsychotic  [] Taper to monotherapy from polypharmacy  [] Augmentation of Clozapine therapy due to treatment resistance to single therapy        TIME SPEND - 35 MINUTES TO COMPLETE THE EVALUATION, DISCHARGE SUMMARY, MEDICATION RECONCILIATION AND FOLLOW UP CARE     Signed:  Kathryn Lee MD  10/21/2022  9:25 AM

## 2022-10-21 NOTE — PROGRESS NOTES
Pt. declined to attend the 0900 community meeting, despite staff encouragement.   GOAL : \"to go to rehab for treatment\" Electronically signed by Shira Zavaleta on 10/21/2022 at 9:46 AM

## 2022-10-21 NOTE — PROGRESS NOTES
Patient up ad jian  cooperative interactive with staff and peers. Attends groups as active particpant and verbalizes understanding of educational materials presented. Patient rates anxiety 2/10 and depression a 0/10. Patient denies SI/HI/AVH. States he is sad about being away from family but he is not depressed. Patient is future goal oriented.

## 2022-10-21 NOTE — PROGRESS NOTES
CLINICAL PHARMACY NOTE: MEDS TO BEDS    Total # of Prescriptions Filled: 1   The following medications were delivered to the patient:  Sertraline 50mg tab    Additional Documentation:

## 2023-11-10 ENCOUNTER — HOSPITAL ENCOUNTER (EMERGENCY)
Age: 43
Discharge: HOME | End: 2023-11-10
Payer: MEDICAID

## 2023-11-10 VITALS
WEIGHT: 195 LBS | SYSTOLIC BLOOD PRESSURE: 137 MMHG | BODY MASS INDEX: 29.55 KG/M2 | OXYGEN SATURATION: 97 % | DIASTOLIC BLOOD PRESSURE: 101 MMHG | RESPIRATION RATE: 18 BRPM | TEMPERATURE: 98.6 F | HEART RATE: 86 BPM | HEIGHT: 68 IN

## 2023-11-10 DIAGNOSIS — S51.812A LACERATION OF LEFT FOREARM, INITIAL ENCOUNTER: Primary | ICD-10-CM

## 2023-11-10 PROCEDURE — 90715 TDAP VACCINE 7 YRS/> IM: CPT

## 2023-11-10 PROCEDURE — 99285 EMERGENCY DEPT VISIT HI MDM: CPT

## 2023-11-10 PROCEDURE — 90471 IMMUNIZATION ADMIN: CPT

## 2023-11-10 PROCEDURE — 6360000002 HC RX W HCPCS

## 2023-11-10 PROCEDURE — 12001 RPR S/N/AX/GEN/TRNK 2.5CM/<: CPT

## 2023-11-10 RX ADMIN — TETANUS TOXOID, REDUCED DIPHTHERIA TOXOID AND ACELLULAR PERTUSSIS VACCINE, ADSORBED 0.5 ML: 5; 2.5; 8; 8; 2.5 SUSPENSION INTRAMUSCULAR at 09:48

## 2023-11-10 ASSESSMENT — ENCOUNTER SYMPTOMS
ABDOMINAL PAIN: 0
COUGH: 0
VOMITING: 0
NAUSEA: 0
SHORTNESS OF BREATH: 0
DIARRHEA: 0
PHOTOPHOBIA: 0

## 2023-11-10 ASSESSMENT — LIFESTYLE VARIABLES
HOW OFTEN DO YOU HAVE A DRINK CONTAINING ALCOHOL: 2-4 TIMES A MONTH
HOW MANY STANDARD DRINKS CONTAINING ALCOHOL DO YOU HAVE ON A TYPICAL DAY: 3 OR 4

## 2023-11-10 ASSESSMENT — PAIN SCALES - GENERAL: PAINLEVEL_OUTOF10: 5

## 2023-11-10 ASSESSMENT — PAIN - FUNCTIONAL ASSESSMENT: PAIN_FUNCTIONAL_ASSESSMENT: 0-10

## 2023-11-10 ASSESSMENT — PAIN DESCRIPTION - PAIN TYPE: TYPE: ACUTE PAIN
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
## 2023-11-10 NOTE — DISCHARGE INSTRUCTIONS
Please have your sutures removed in 7-10 days. Please return for evaluation if you notice any redness, warmth, swelling, increased pain, pus to the area.